# Patient Record
Sex: FEMALE | Employment: FULL TIME | URBAN - METROPOLITAN AREA
[De-identification: names, ages, dates, MRNs, and addresses within clinical notes are randomized per-mention and may not be internally consistent; named-entity substitution may affect disease eponyms.]

---

## 2020-02-14 ENCOUNTER — ANESTHESIA EVENT (OUTPATIENT)
Dept: ENDOSCOPY | Age: 30
End: 2020-02-14
Payer: COMMERCIAL

## 2020-02-14 ENCOUNTER — APPOINTMENT (OUTPATIENT)
Dept: ULTRASOUND IMAGING | Age: 30
End: 2020-02-14
Attending: INTERNAL MEDICINE
Payer: COMMERCIAL

## 2020-02-14 ENCOUNTER — ANESTHESIA (OUTPATIENT)
Dept: ENDOSCOPY | Age: 30
End: 2020-02-14
Payer: COMMERCIAL

## 2020-02-14 ENCOUNTER — HOSPITAL ENCOUNTER (OUTPATIENT)
Age: 30
Setting detail: OUTPATIENT SURGERY
Discharge: HOME | End: 2020-02-14
Attending: INTERNAL MEDICINE | Admitting: INTERNAL MEDICINE
Payer: COMMERCIAL

## 2020-02-14 VITALS
WEIGHT: 109 LBS | HEART RATE: 79 BPM | HEIGHT: 57 IN | OXYGEN SATURATION: 100 % | SYSTOLIC BLOOD PRESSURE: 110 MMHG | TEMPERATURE: 97.6 F | DIASTOLIC BLOOD PRESSURE: 75 MMHG | BODY MASS INDEX: 23.51 KG/M2 | RESPIRATION RATE: 12 BRPM

## 2020-02-14 LAB — HCG UR QL: NEGATIVE

## 2020-02-14 PROCEDURE — 74011250636 HC RX REV CODE- 250/636: Performed by: NURSE ANESTHETIST, CERTIFIED REGISTERED

## 2020-02-14 PROCEDURE — 77030013996 HC SNR POLYP ENDOSC OCOA -B: Performed by: INTERNAL MEDICINE

## 2020-02-14 PROCEDURE — 77030036802 HC DEV EMR CAPTIVATOR BSC -D: Performed by: INTERNAL MEDICINE

## 2020-02-14 PROCEDURE — 76040000007: Performed by: INTERNAL MEDICINE

## 2020-02-14 PROCEDURE — 77030013992 HC SNR POLYP ENDOSC BSC -B: Performed by: INTERNAL MEDICINE

## 2020-02-14 PROCEDURE — 77030021453: Performed by: INTERNAL MEDICINE

## 2020-02-14 PROCEDURE — 81025 URINE PREGNANCY TEST: CPT

## 2020-02-14 PROCEDURE — 74011000250 HC RX REV CODE- 250: Performed by: NURSE ANESTHETIST, CERTIFIED REGISTERED

## 2020-02-14 PROCEDURE — 77030003657 HC NDL SCLER BSC -B: Performed by: INTERNAL MEDICINE

## 2020-02-14 PROCEDURE — 77030038665 HC SOL ELEVIEW INJ AGNT ARPH -B: Performed by: INTERNAL MEDICINE

## 2020-02-14 PROCEDURE — 76060000032 HC ANESTHESIA 0.5 TO 1 HR: Performed by: INTERNAL MEDICINE

## 2020-02-14 RX ORDER — PROPOFOL 10 MG/ML
INJECTION, EMULSION INTRAVENOUS AS NEEDED
Status: DISCONTINUED | OUTPATIENT
Start: 2020-02-14 | End: 2020-02-14 | Stop reason: HOSPADM

## 2020-02-14 RX ORDER — GLYCOPYRROLATE 0.2 MG/ML
INJECTION INTRAMUSCULAR; INTRAVENOUS AS NEEDED
Status: DISCONTINUED | OUTPATIENT
Start: 2020-02-14 | End: 2020-02-14 | Stop reason: HOSPADM

## 2020-02-14 RX ORDER — MULTIVITAMIN WITH IRON
1 TABLET ORAL DAILY
COMMUNITY

## 2020-02-14 RX ORDER — EPINEPHRINE 0.1 MG/ML
1 INJECTION INTRACARDIAC; INTRAVENOUS
Status: DISCONTINUED | OUTPATIENT
Start: 2020-02-14 | End: 2020-02-14 | Stop reason: HOSPADM

## 2020-02-14 RX ORDER — SODIUM CHLORIDE 9 MG/ML
50 INJECTION, SOLUTION INTRAVENOUS CONTINUOUS
Status: DISCONTINUED | OUTPATIENT
Start: 2020-02-14 | End: 2020-02-14 | Stop reason: HOSPADM

## 2020-02-14 RX ORDER — DEXTROMETHORPHAN/PSEUDOEPHED 2.5-7.5/.8
1.2 DROPS ORAL
Status: DISCONTINUED | OUTPATIENT
Start: 2020-02-14 | End: 2020-02-14 | Stop reason: HOSPADM

## 2020-02-14 RX ORDER — FLUMAZENIL 0.1 MG/ML
0.2 INJECTION INTRAVENOUS
Status: DISCONTINUED | OUTPATIENT
Start: 2020-02-14 | End: 2020-02-14 | Stop reason: HOSPADM

## 2020-02-14 RX ORDER — SODIUM CHLORIDE 9 MG/ML
INJECTION, SOLUTION INTRAVENOUS
Status: DISCONTINUED | OUTPATIENT
Start: 2020-02-14 | End: 2020-02-14 | Stop reason: HOSPADM

## 2020-02-14 RX ORDER — NALOXONE HYDROCHLORIDE 0.4 MG/ML
0.4 INJECTION, SOLUTION INTRAMUSCULAR; INTRAVENOUS; SUBCUTANEOUS
Status: DISCONTINUED | OUTPATIENT
Start: 2020-02-14 | End: 2020-02-14 | Stop reason: HOSPADM

## 2020-02-14 RX ORDER — ATROPINE SULFATE 0.1 MG/ML
0.5 INJECTION INTRAVENOUS
Status: DISCONTINUED | OUTPATIENT
Start: 2020-02-14 | End: 2020-02-14 | Stop reason: HOSPADM

## 2020-02-14 RX ORDER — SODIUM CHLORIDE 0.9 % (FLUSH) 0.9 %
5-40 SYRINGE (ML) INJECTION AS NEEDED
Status: DISCONTINUED | OUTPATIENT
Start: 2020-02-14 | End: 2020-02-14 | Stop reason: HOSPADM

## 2020-02-14 RX ADMIN — PROPOFOL 100 MG: 10 INJECTION, EMULSION INTRAVENOUS at 08:24

## 2020-02-14 RX ADMIN — PROPOFOL 100 MG: 10 INJECTION, EMULSION INTRAVENOUS at 08:32

## 2020-02-14 RX ADMIN — PROPOFOL 100 MG: 10 INJECTION, EMULSION INTRAVENOUS at 08:28

## 2020-02-14 RX ADMIN — GLYCOPYRROLATE 0.2 MG: 0.2 INJECTION, SOLUTION INTRAMUSCULAR; INTRAVENOUS at 08:08

## 2020-02-14 RX ADMIN — PROPOFOL 50 MG: 10 INJECTION, EMULSION INTRAVENOUS at 08:20

## 2020-02-14 RX ADMIN — PROPOFOL 50 MG: 10 INJECTION, EMULSION INTRAVENOUS at 08:18

## 2020-02-14 RX ADMIN — PROPOFOL 100 MG: 10 INJECTION, EMULSION INTRAVENOUS at 08:37

## 2020-02-14 RX ADMIN — SODIUM CHLORIDE: 900 INJECTION, SOLUTION INTRAVENOUS at 08:04

## 2020-02-14 NOTE — DISCHARGE INSTRUCTIONS
118 [facility].  [address] [place] [address]  [phone]                     DISCHARGE INSTRUCTIONS    Oswaldo Dukes  [id number]  1990    DISCOMFORT:  Sore throat- throat lozenges or warm salt water gargle  redness at IV site- apply warm compress to area; if redness or soreness persist- contact your physician  Gaseous discomfort- walking, belching will help relieve any discomfort    DIET  You may eat and drink after you leave. You may resume your regular diet - however -  remember your colon is empty and a heavy meal will produce gas. Avoid these foods:  vegetables, fried / greasy foods, carbonated drinks    ACTIVITY  You may resume your normal daily activities   Spend the remainder of the day resting -  avoid any strenuous activity. You may not operate a vehicle for 12 hours  You may not engage in an occupation involving machinery or appliances for rest of today  You may not drink alcoholic beverages for at least 12 hours  Avoid making any critical decisions for at least 24 hour    CALL M.D. ANY SIGN OF   Increasing pain, nausea, vomiting  Abdominal distension (swelling)  New increased bleeding (oral or rectal)  Fever (chills)  Pain in chest area  Bloody discharge from nose or mouth  Shortness of breath    Follow-up Instructions:   Call Dr. Kelly Camacho for any questions or problems. If we took a biopsy please call the office within 2 weeks to discuss your pathology results.  Telephone # [phone]       ENDOSCOPY FINDINGS:   esophageal submucosal lesion

## 2020-02-14 NOTE — ANESTHESIA POSTPROCEDURE EVALUATION
Procedure(s):  RADIAL EUS (UPPER) WITH EMR  ESOPHAGOGASTRODUODENOSCOPY (EGD)  ENDOSCOPIC BANDING OR LIGATION  ENDOSCOPIC POLYPECTOMY  INJECTION. MAC    Anesthesia Post Evaluation        Patient location during evaluation: PACU  Patient participation: complete - patient participated  Level of consciousness: awake and alert  Pain management: adequate  Airway patency: patent  Anesthetic complications: no  Cardiovascular status: acceptable  Respiratory status: acceptable  Hydration status: acceptable  Comments: I have seen and evaluated the patient and is ready for discharge. Lisseth Trejo MD    Post anesthesia nausea and vomiting:  none      Vitals Value Taken Time   /74 2/14/2020  9:11 AM   Temp 36.4 °C (97.6 °F) 2/14/2020  9:09 AM   Pulse 78 2/14/2020  9:11 AM   Resp 11 2/14/2020  9:11 AM   SpO2 100 % 2/14/2020  9:11 AM   Vitals shown include unvalidated device data.

## 2020-02-14 NOTE — PROCEDURES
118 [facility].  [address] [facility], [address]   [phone]                                Endoscopic Ultrasound    NAME:  Clark Bowen   :   1990   MRN:   [id number]       Date/Time:  2020   Procedure Type: Radial Upper EUS and EMR      Indications: Esophageal intramural lesion    Pre-operative Diagnosis: see indication above    Post-operative Diagnosis:  See findings below    : Bianca Smith MD    Surgical Assistant: None    Implants: none    Referring Provider: -Deja Ceballos MD, Pepper Gruber MD    Anethesia/Sedation:  MAC anesthesia      Procedure Details   After infom consent was obtained for the procedure, with all risks and benefits of procedure explained the patient was taken to the endoscopy suite and placed in the left lateral decubitus position. Following sequential administration of sedation as per above, the radial echoendoscope was inserted into the mouth and advanced under direct vision to second portion of the duodenum. A careful inspection was made as the gastroscope was withdrawn, including a retroflexed view of the proximal stomach; findings and interventions are described below. Findings:     Endoscopic:   Esophagus: A submucosal lesion 5 mm in size and a yellowish coloration was noted in the esophagus at 22 cm. Overlying mucosa was normal.    Stomach: normal    Duodenum/jejunum: normal    Ultrasound:   Esophagus: A well circumscribed lesion 5 X 4 mm in size was noted in the submucosal layer. This was hypoechoic. No infiltration into the muscularis propria or adjacent organs was noted. No perilesional nodes were noted. Stomach: not examined   Pancreas:               Lymph Node: no adenopathy        Specimen Removed: Lesion was marked. Eleview injection(2ml) was performed and the lesion lifted well. A band was applied at the base of the lesion.  Resection was not performed as there was a larger chunk of mucosa caught in the band. It is expected that the lesion will slough off spontaneously. Complications: None. EBL:  None. Interventions: Lesion was marked. Eleview injection(2ml) was performed and the lesion lifted well. A band was applied at the base of the lesion. Resection was not performed as there was a larger chunk of mucosa caught in the band. It is expected that the lesion will slough off spontaneously.         Recommendations:   -Liquid diet today and soft diet tomorrow  -Continue current medications  -EGD in 3 months for surveillance    Massiel Barahona MD  2/14/2020  8:54 AM

## 2020-02-14 NOTE — H&P
[address] [facility] [address]  [address] [place]  [place], [address]  [phone]                                History and Physical     NAME: Prudence Monreal   :  1990   MRN:  [id number]     HPI:  The patient was seen and examined. History reviewed. No pertinent surgical history. Past Medical History:   Diagnosis Date    GERD (gastroesophageal reflux disease)      Social History     Tobacco Use    Smoking status: Not on file   Substance Use Topics    Alcohol use: Not on file    Drug use: Not on file     Not on File  History reviewed. No pertinent family history. Current Facility-Administered Medications   Medication Dose Route Frequency    0.9% sodium chloride infusion  50 mL/hr IntraVENous CONTINUOUS    sodium chloride (NS) flush 5-40 mL  5-40 mL IntraVENous PRN    naloxone (NARCAN) injection 0.4 mg  0.4 mg IntraVENous Multiple    flumazeniL (ROMAZICON) 0.1 mg/mL injection 0.2 mg  0.2 mg IntraVENous Multiple    simethicone (MYLICON) 68TZ/0.2CH oral drops 80 mg  1.2 mL Oral Multiple    atropine injection 0.5 mg  0.5 mg IntraVENous ONCE PRN    EPINEPHrine (ADRENALIN) 0.1 mg/mL syringe 1 mg  1 mg Endoscopically ONCE PRN     Facility-Administered Medications Ordered in Other Encounters   Medication Dose Route Frequency    glycopyrrolate (ROBINUL) injection   IntraVENous PRN         PHYSICAL EXAM:  General: WD, WN. Alert, cooperative, no acute distress    HEENT: NC, Atraumatic. PERRLA, EOMI. Anicteric sclerae. Lungs:  CTA Bilaterally. No Wheezing/Rhonchi/Rales. Heart:  Regular  rhythm,  No murmur, No Rubs, No Gallops  Abdomen: Soft, Non distended, Non tender.  +Bowel sounds, no HSM  Extremities: No c/c/e  Neurologic:  CN 2-12 gi, Alert and oriented X 3. No acute neurological distress   Psych:   Good insight. Not anxious nor agitated. The heart, lungs and mental status were satisfactory for the administration of MAC sedation and for the procedure.       Mallampati score: 2       Assessment:   · Submucosal lesion esophagus    Plan:   · Endoscopic procedure  · MAC sedation   ·

## 2020-02-14 NOTE — ROUTINE PROCESS
[facility] 1990 
[id number] Situation: 
Verbal report received from: Binta Montiel Procedure: Procedure(s): 
RADIAL EUS (UPPER) WITH EMR 
ESOPHAGOGASTRODUODENOSCOPY (EGD) ENDOSCOPIC BANDING OR LIGATION Background: 
 
Preoperative diagnosis: EPIGASTRIC PAIN Postoperative diagnosis: esophageal submucosal lesion :  Dr. Crystal Luna 
Assistant(s): Endoscopy Technician-1: Pedro John 
Endoscopy RN-1: Bakari Pal RN Specimens: * No specimens in log * H. Pylori  no Assessment: 
Intra-procedure medications Anesthesia gave intra-procedure sedation and medications, see anesthesia flow sheet yes Intravenous fluids: NS@ Floyd Awkward Vital signs stable Abdominal assessment: round and soft Recommendation: 
Discharge patient per MD order. Family or Friend Permission to share finding with family or friend yes

## 2020-02-14 NOTE — PERIOP NOTES

## (undated) DEVICE — DISPOSABLE EMR KIT: Brand: DISPOSABLE EMR KIT

## (undated) DEVICE — TUBING HYDR IRR --

## (undated) DEVICE — Device: Brand: DISPOSABLE ELECTROSURGICAL SNARE

## (undated) DEVICE — SNARE ENDOSCP M L240CM W27MM SHTH DIA2.4MM CHN 2.8MM OVL

## (undated) DEVICE — NEEDLE SCLERO 23GA L4MM CATH L240CM CNTRST SHTH DIA1.8MM

## (undated) DEVICE — ENDOSCOPIC MUCOSAL RESECTION DEVICE: Brand: CAPTIVATOR EMR

## (undated) DEVICE — (D)AGENT INJECTN ELEVIEW 10ML -- DISC BY MFG